# Patient Record
Sex: FEMALE | Race: WHITE | NOT HISPANIC OR LATINO
[De-identification: names, ages, dates, MRNs, and addresses within clinical notes are randomized per-mention and may not be internally consistent; named-entity substitution may affect disease eponyms.]

---

## 2018-10-17 ENCOUNTER — APPOINTMENT (OUTPATIENT)
Dept: PLASTIC SURGERY | Facility: CLINIC | Age: 28
End: 2018-10-17

## 2018-10-17 PROBLEM — Z00.00 ENCOUNTER FOR PREVENTIVE HEALTH EXAMINATION: Status: ACTIVE | Noted: 2018-10-17

## 2019-02-05 ENCOUNTER — APPOINTMENT (OUTPATIENT)
Dept: PLASTIC SURGERY | Facility: CLINIC | Age: 29
End: 2019-02-05
Payer: SELF-PAY

## 2019-02-05 PROCEDURE — 17999 UNLISTD PX SKN MUC MEMB SUBQ: CPT

## 2019-04-22 ENCOUNTER — APPOINTMENT (OUTPATIENT)
Dept: PLASTIC SURGERY | Facility: CLINIC | Age: 29
End: 2019-04-22

## 2019-05-07 ENCOUNTER — APPOINTMENT (OUTPATIENT)
Dept: PLASTIC SURGERY | Facility: CLINIC | Age: 29
End: 2019-05-07
Payer: SELF-PAY

## 2019-05-07 PROCEDURE — 17999 UNLISTD PX SKN MUC MEMB SUBQ: CPT

## 2019-11-20 ENCOUNTER — APPOINTMENT (OUTPATIENT)
Dept: PLASTIC SURGERY | Facility: CLINIC | Age: 29
End: 2019-11-20
Payer: SELF-PAY

## 2019-11-20 PROCEDURE — 17999 UNLISTD PX SKN MUC MEMB SUBQ: CPT

## 2020-02-21 ENCOUNTER — APPOINTMENT (OUTPATIENT)
Dept: PLASTIC SURGERY | Facility: CLINIC | Age: 30
End: 2020-02-21
Payer: SELF-PAY

## 2020-02-21 PROCEDURE — 99212 OFFICE O/P EST SF 10 MIN: CPT | Mod: NC

## 2020-06-29 ENCOUNTER — APPOINTMENT (OUTPATIENT)
Dept: PLASTIC SURGERY | Facility: CLINIC | Age: 30
End: 2020-06-29
Payer: SELF-PAY

## 2020-06-29 PROCEDURE — 17999 UNLISTD PX SKN MUC MEMB SUBQ: CPT

## 2020-10-09 ENCOUNTER — APPOINTMENT (OUTPATIENT)
Dept: PLASTIC SURGERY | Facility: CLINIC | Age: 30
End: 2020-10-09

## 2020-10-16 ENCOUNTER — APPOINTMENT (OUTPATIENT)
Dept: PLASTIC SURGERY | Facility: CLINIC | Age: 30
End: 2020-10-16
Payer: SELF-PAY

## 2020-10-16 PROCEDURE — 17999 UNLISTD PX SKN MUC MEMB SUBQ: CPT

## 2020-12-29 ENCOUNTER — APPOINTMENT (OUTPATIENT)
Dept: PLASTIC SURGERY | Facility: CLINIC | Age: 30
End: 2020-12-29
Payer: SELF-PAY

## 2020-12-29 PROCEDURE — 17999 UNLISTD PX SKN MUC MEMB SUBQ: CPT | Mod: 78

## 2021-04-27 ENCOUNTER — APPOINTMENT (OUTPATIENT)
Dept: PLASTIC SURGERY | Facility: CLINIC | Age: 31
End: 2021-04-27
Payer: SELF-PAY

## 2021-04-27 PROCEDURE — 17999 UNLISTD PX SKN MUC MEMB SUBQ: CPT

## 2021-11-04 ENCOUNTER — APPOINTMENT (OUTPATIENT)
Dept: PLASTIC SURGERY | Facility: CLINIC | Age: 31
End: 2021-11-04
Payer: SELF-PAY

## 2021-11-04 PROCEDURE — 17999 UNLISTD PX SKN MUC MEMB SUBQ: CPT

## 2022-02-01 ENCOUNTER — APPOINTMENT (OUTPATIENT)
Dept: PLASTIC SURGERY | Facility: CLINIC | Age: 32
End: 2022-02-01
Payer: SELF-PAY

## 2022-02-01 PROCEDURE — 17999 UNLISTD PX SKN MUC MEMB SUBQ: CPT

## 2022-04-20 ENCOUNTER — APPOINTMENT (OUTPATIENT)
Dept: PLASTIC SURGERY | Facility: CLINIC | Age: 32
End: 2022-04-20
Payer: SELF-PAY

## 2022-04-20 PROCEDURE — 17999 UNLISTD PX SKN MUC MEMB SUBQ: CPT | Mod: 78

## 2022-09-30 ENCOUNTER — APPOINTMENT (OUTPATIENT)
Dept: PLASTIC SURGERY | Facility: CLINIC | Age: 32
End: 2022-09-30

## 2022-09-30 PROCEDURE — 17999 UNLISTD PX SKN MUC MEMB SUBQ: CPT

## 2024-08-26 ENCOUNTER — NON-APPOINTMENT (OUTPATIENT)
Age: 34
End: 2024-08-26

## 2024-08-29 ENCOUNTER — NON-APPOINTMENT (OUTPATIENT)
Age: 34
End: 2024-08-29

## 2024-08-30 ENCOUNTER — LABORATORY RESULT (OUTPATIENT)
Age: 34
End: 2024-08-30

## 2024-08-30 ENCOUNTER — TRANSCRIPTION ENCOUNTER (OUTPATIENT)
Age: 34
End: 2024-08-30

## 2024-08-30 ENCOUNTER — APPOINTMENT (OUTPATIENT)
Age: 34
End: 2024-08-30

## 2024-08-30 VITALS
WEIGHT: 136 LBS | HEART RATE: 78 BPM | BODY MASS INDEX: 24.1 KG/M2 | OXYGEN SATURATION: 100 % | DIASTOLIC BLOOD PRESSURE: 65 MMHG | SYSTOLIC BLOOD PRESSURE: 110 MMHG | HEIGHT: 63 IN

## 2024-08-30 VITALS
HEART RATE: 78 BPM | OXYGEN SATURATION: 100 % | SYSTOLIC BLOOD PRESSURE: 110 MMHG | DIASTOLIC BLOOD PRESSURE: 65 MMHG | WEIGHT: 136 LBS | BODY MASS INDEX: 24.1 KG/M2 | HEIGHT: 63 IN

## 2024-08-30 DIAGNOSIS — Z01.419 ENCOUNTER FOR GYNECOLOGICAL EXAMINATION (GENERAL) (ROUTINE) W/OUT ABNORMAL FINDINGS: ICD-10-CM

## 2024-08-30 DIAGNOSIS — Z86.19 PERSONAL HISTORY OF OTHER INFECTIOUS AND PARASITIC DISEASES: ICD-10-CM

## 2024-08-30 DIAGNOSIS — Z31.69 ENCOUNTER FOR OTHER GENERAL COUNSELING AND ADVICE ON PROCREATION: ICD-10-CM

## 2024-08-30 PROCEDURE — 99385 PREV VISIT NEW AGE 18-39: CPT

## 2024-08-30 PROCEDURE — 99459 PELVIC EXAMINATION: CPT

## 2024-08-30 RX ORDER — LEVONORGESTREL AND ETHINYL ESTRADIOL 0.15-0.03
0.15-3 KIT ORAL DAILY
Qty: 3 | Refills: 4 | Status: ACTIVE | COMMUNITY
Start: 2024-08-30 | End: 1900-01-01

## 2024-08-30 NOTE — PLAN
[FreeTextEntry1] : -Cotesting -Birth control sent to pharmacy  RTO in 1 year or for colposcopy if indicated

## 2024-08-30 NOTE — HISTORY OF PRESENT ILLNESS
[Patient reported PAP Smear was abnormal] : Patient reported PAP Smear was abnormal [Y] : Patient is sexually active [N] : Patient denies prior pregnancies [Currently Active] : currently active [Men] : men [No] : No [Patient refuses STI testing] : Patient refuses STI testing [FreeTextEntry1] : 32yo G0 LMP: 8/14/24 here for WWE and repeat pap smear. Patient states she has had HPV 16 on pap smear with normal cytology s/p colposcopy and biopsies over the years. Patient did not receive gardasil vaccine, 1 partner for last 2 years, denies tobacco use. Discussion on repeat pap smear with hpv genotyping and colpo/biopsy if indicated and based on results if excision therapy is needed. Patient is concerned about it being peristently present. Will start with pap smear and if indicated colpo + biopsies and go from there. Using shared decision making process patient agrees with that plan. Patient is possibly interested in egg freezing in the future. ROS negative.   OB: G0 GYN: LMP: 8/14/24,   h/o HPV on multiple pap smears with normal cells, multiple colposcopy's and biopsies, with no further need, never received gardasil vaccine PMHx: denies Sx: denies NKDA Meds: Levora 28 0.15-0.03 mg per, aldaptone (acne) FHx: dad - MI 5 years ago, skin cancer; mother - healthy, paternal grandomther - breast/lung cancer;  Social: med/surg nurse at [employer]  [PapSmeardate] : 10/2023 [TextBox_31] : HPV ,Patient had colpo and everything was clear [LMPDate] : 08/14/24 [MensesFreq] : 28 [MensesLength] : 3 [TextBox_6] : 08/14/24 [TextBox_9] : 15 [FreeTextEntry3] : Birth Control

## 2024-08-30 NOTE — PHYSICAL EXAM
[98049] : A chaperone was present during the pelvic exam. [FreeTextEntry2] : HUMBERTO Jansen [Appropriately responsive] : appropriately responsive [Alert] : alert [Examination Of The Breasts] : a normal appearance [No Masses] : no breast masses were palpable [Normal] : normal

## 2024-09-02 LAB
ANTI-MUELLERIAN HORMONE: 2.97 NG/ML
HPV HIGH+LOW RISK DNA PNL CVX: DETECTED

## 2024-09-06 ENCOUNTER — TRANSCRIPTION ENCOUNTER (OUTPATIENT)
Age: 34
End: 2024-09-06

## 2024-09-06 LAB — CYTOLOGY CVX/VAG DOC THIN PREP: NORMAL

## 2024-09-09 ENCOUNTER — TRANSCRIPTION ENCOUNTER (OUTPATIENT)
Age: 34
End: 2024-09-09

## 2024-09-17 ENCOUNTER — NON-APPOINTMENT (OUTPATIENT)
Age: 34
End: 2024-09-17

## 2024-09-17 ENCOUNTER — TRANSCRIPTION ENCOUNTER (OUTPATIENT)
Age: 34
End: 2024-09-17

## 2024-09-18 ENCOUNTER — TRANSCRIPTION ENCOUNTER (OUTPATIENT)
Age: 34
End: 2024-09-18